# Patient Record
Sex: FEMALE | Race: WHITE
[De-identification: names, ages, dates, MRNs, and addresses within clinical notes are randomized per-mention and may not be internally consistent; named-entity substitution may affect disease eponyms.]

---

## 2021-07-20 ENCOUNTER — HOSPITAL ENCOUNTER (EMERGENCY)
Dept: HOSPITAL 97 - ER | Age: 56
Discharge: HOME | End: 2021-07-20
Payer: COMMERCIAL

## 2021-07-20 VITALS — TEMPERATURE: 98.2 F

## 2021-07-20 VITALS — DIASTOLIC BLOOD PRESSURE: 91 MMHG | SYSTOLIC BLOOD PRESSURE: 128 MMHG | OXYGEN SATURATION: 99 %

## 2021-07-20 DIAGNOSIS — R63.0: ICD-10-CM

## 2021-07-20 DIAGNOSIS — R68.84: Primary | ICD-10-CM

## 2021-07-20 DIAGNOSIS — Z88.0: ICD-10-CM

## 2021-07-20 DIAGNOSIS — I10: ICD-10-CM

## 2021-07-20 LAB
ALBUMIN SERPL BCP-MCNC: 4.2 G/DL (ref 3.4–5)
ALP SERPL-CCNC: 106 U/L (ref 45–117)
ALT SERPL W P-5'-P-CCNC: 30 U/L (ref 12–78)
AST SERPL W P-5'-P-CCNC: 21 U/L (ref 15–37)
BUN BLD-MCNC: 14 MG/DL (ref 7–18)
GLUCOSE SERPLBLD-MCNC: 131 MG/DL (ref 74–106)
HCT VFR BLD CALC: 40.9 % (ref 36–45)
INR BLD: 0.97
LYMPHOCYTES # SPEC AUTO: 1.3 K/UL (ref 0.7–4.9)
MAGNESIUM SERPL-MCNC: 2.1 MG/DL (ref 1.8–2.4)
NT-PROBNP SERPL-MCNC: 58 PG/ML (ref ?–125)
PMV BLD: 7.5 FL (ref 7.6–11.3)
POTASSIUM SERPL-SCNC: 3.7 MMOL/L (ref 3.5–5.1)
RBC # BLD: 4.65 M/UL (ref 3.86–4.86)
SP GR UR: 1.01 (ref 1–1.03)
TROPONIN (EMERG DEPT USE ONLY): < 0.02 NG/ML (ref 0–0.04)
TSH SERPL DL<=0.05 MIU/L-ACNC: 0.36 UIU/ML (ref 0.36–3.74)

## 2021-07-20 PROCEDURE — 71045 X-RAY EXAM CHEST 1 VIEW: CPT

## 2021-07-20 PROCEDURE — 85025 COMPLETE CBC W/AUTO DIFF WBC: CPT

## 2021-07-20 PROCEDURE — 84443 ASSAY THYROID STIM HORMONE: CPT

## 2021-07-20 PROCEDURE — 36415 COLL VENOUS BLD VENIPUNCTURE: CPT

## 2021-07-20 PROCEDURE — 80048 BASIC METABOLIC PNL TOTAL CA: CPT

## 2021-07-20 PROCEDURE — 93005 ELECTROCARDIOGRAM TRACING: CPT

## 2021-07-20 PROCEDURE — 83880 ASSAY OF NATRIURETIC PEPTIDE: CPT

## 2021-07-20 PROCEDURE — 84484 ASSAY OF TROPONIN QUANT: CPT

## 2021-07-20 PROCEDURE — 81003 URINALYSIS AUTO W/O SCOPE: CPT

## 2021-07-20 PROCEDURE — 81025 URINE PREGNANCY TEST: CPT

## 2021-07-20 PROCEDURE — 83735 ASSAY OF MAGNESIUM: CPT

## 2021-07-20 PROCEDURE — 85610 PROTHROMBIN TIME: CPT

## 2021-07-20 PROCEDURE — 99284 EMERGENCY DEPT VISIT MOD MDM: CPT

## 2021-07-20 PROCEDURE — 80076 HEPATIC FUNCTION PANEL: CPT

## 2021-07-20 NOTE — XMS REPORT
Continuity of Care Document

                            Created on:2021



Patient:NANCY PERERA

Sex:Female

:1965

External Reference #:[id number]





Demographics







                          Address                   [address] [id number]

 

                          Home Phone                [phone]

 

                          Work Phone                [phone]

 

                          Mobile Phone              [phone]

 

                          Email Address             [email or web address]

 

                          Preferred Language        English

 

                          Marital Status            Unknown

 

                          [employer] Affiliation     Unknown

 

                          Race                      Unknown

 

                          Additional Race(s)        Unavailable



                                                    White

 

                          Ethnic Group              Unknown









Author







                          Organization              [facility]

t

 

                          Address                   [address]



                                                    [place] [address]

 

                          Phone                     [phone]









Support







                Name            Relationship    Address         Phone

 

                ANDEL           Unavailable     .               [phone]

 

                ANDEL           Unavailable     .               [phone]

 

                ANDEL           Unavailable     [address] [phone]



                                                [place] [address] 

 

                ANDEL           Unavailable     [address] [phone]



                                                [place] [address] 

 

                Andel           Spouse          [address] [phone]

2



                                                [place] [id number] 

 

                Jin         Mother          [address] [phone]

2



                                                [place] [id number] 









Care Team Providers







                    Name                Role                Phone

 

                    Lab,  [facility]am [facility]     Attending Clinician Unavailable

 

                    Doctor Unassigned,  Name Attending Clinician Unavailable

 

                    SASSARD             Attending Clinician Unavailable









Payers







           Payer Name Policy Type Policy Number Effective Date Expiration Date S

ource







Problems

This patient has no known problems.



Allergies, Adverse Reactions, Alerts







       Allergy Allergy Status Severity Reaction(s) Onset  Inactive Treating Comm

ents 

Source



       Name   Type                        Date   Date   Clinician        

 

       Penicill DA     Active U             2018                      [facility]



       ins                                                        [place]



                                          00:00:                      [facility]



                                          00                          [facility]



                                                                      [facility]



                                                                      l







Medications

This patient has no known medications.



Procedures

This patient has no known procedures.



Encounters







        Start   End     Encounter Admission Attending Care    Care    Encounter 

Source



        Date/Time Date/Time Type    Type    Clinicians Facility Department ID   

   

 

        2020-10-26 2020-10-26 Laboratory         Lab, [facility]    [id number]

[id number] 



        10:47:02 11:07:02 Only            [facility]  [id number]         



                                                [place] [id number]         



                                                [place] [phone]         



                                                nal     044             



                                                Office                  



                                                Building                 



                                                One                     

 

        2020-10-26 2020-10-26 Letter          Doctor  ZACHARY    [id number]

90 



        00:00:00 00:00:00 (Out)           Unassigned, DANIA   [id number]       

  



                                        [facility] [id number]         



                                                        [phone]         



                                                        044             

 

        2020 Outpatient         [facility]     [id number]

[address]



        00:00:00 00:00:00                 BIJAN                  [address]

i



                                                                        st

 

        2020 Outpatient         [facility]     [id number]

[address]



        00:00:00 00:00:00                 BIJAN                  [address]     [facility]

i



                                                                        st

 

        2020 [facility]     [id number]

[address]



        00:00:00 00:00:00                 BIJAN                  [address]     [facility]

i



                                                                        st

 

        2020 Outpatient         [facility]     [id number]

[address]



        00:00:00 00:00:00                 BIJAN                  [address]     [facility]

i



                                                                        st

 

        2020 Outpatient         [facility]     [id number]

[address]



        00:00:00 00:00:00                 BIJAN                  [address]     [facility]

i



                                                                        st

 

        2020 Outpatient         [facility]     [id number]

[address]



        00:00:00 00:00:00                 BIJAN                  [address]     [address]

i



                                                                        st







Results

This patient has no known results.

## 2021-07-20 NOTE — RAD REPORT
EXAM DESCRIPTION:  Karla Single View7/20/2021 2:38 pm

 

CLINICAL HISTORY:  Chest pain

 

COMPARISON:  none

 

FINDINGS:   The lungs appear clear of acute infiltrate. The heart is normal size

 

IMPRESSION:   No acute abnormalities displayed

## 2021-07-20 NOTE — EDPHYS
Physician Documentation                                                                           

 [facility]                                                                 

Name: Alda Her                                                                                

Age: 56 yrs                                                                                       

Sex: Female                                                                                       

: 1965                                                                                   

MRN: [id number]                                                                                   

Arrival Date: 2021                                                                          

Time: 12:31                                                                                       

Account#: [id number]                                                                            

Bed 14                                                                                            

Private MD: Tressa Millan K                                                                     

ED Physician Wiley Walden                                                                       

HPI:                                                                                              

                                                                                             

14:05 This 56 yrs old  Female presents to ER via Ambulatory with complaints of Blood pm1 

      Pressure Problem, Nausea, Dizziness.                                                        

14:05 The patient presents with dizziness, hot flashes, left jaw pain, nausea, blood pressure pm1 

      elevation. Onset: The symptoms/episode began/occurred 2 week(s) ago. Context: Unknown.      

      Patient post menopausal for about 1 year. Modifying factors: The symptoms are               

      alleviated by nothing, the symptoms are aggravated by nothing. Associated signs and         

      symptoms: Pertinent negatives: abdominal pain, chest pain, diaphoresis, near-syncope,       

      numbness, shortness of breath, syncope, tingling, vomiting. Severity of symptoms: in        

      the emergency department the symptoms have improved Pain is currently a 0 / 10.             

      Patient's baseline: Neuro: alert and fully oriented, Motor: no deficits, Ambulation:        

      walks without assistance, Speech: normal.                                                   

                                                                                                  

OB/GYN:                                                                                           

13:39 LMP N/A - Post-menopause                                                                tw2 

                                                                                                  

Historical:                                                                                       

- Allergies:                                                                                      

13:09 PENICILLINS;                                                                            ph  

- PMHx:                                                                                           

13:09 Hypertensive disorder; Hypothyroidism;                                                  ph  

- PSHx:                                                                                           

13:09 shoulder;  section;                                                             ph  

                                                                                                  

- Immunization history:: Client reports having NOT received the Covid vaccine.                    

- Social history:: Smoking status: Patient denies any tobacco usage or history of.                

  Patient uses street drugs, marijuana.                                                           

                                                                                                  

                                                                                                  

ROS:                                                                                              

14:05 Constitutional: Negative for fever, chills, and weight loss.                            pm1 

14:05 Neck: Negative for injury, pain, and swelling, Cardiovascular: Negative for chest pain,     

      palpitations, and edema, Respiratory: Negative for shortness of breath, cough,              

      wheezing, and pleuritic chest pain, Abdomen/GI: Negative for abdominal pain, nausea,        

      vomiting, diarrhea, and constipation, Back: Negative for injury and pain, MS/Extremity:     

      Negative for injury and deformity, Skin: Negative for injury, rash, and discoloration.      

14:05 Neuro: Positive for dizziness, Negative for headache, numbness, syncope, near syncope,      

      tingling, weakness.                                                                         

14:05 All other systems are negative.                                                             

                                                                                                  

Exam:                                                                                             

14:05 Constitutional:  This is a well developed, well nourished patient who is awake, alert,  pm1 

      and in no acute distress. Head/Face:  Normocephalic, atraumatic.                            

14:05 Neck:  Trachea midline, no thyromegaly or masses palpated, and no cervical                  

      lymphadenopathy.  Supple, full range of motion without nuchal rigidity, or vertebral        

      point tenderness.  No Meningismus.                                                          

14:05 Chest/axilla:  Normal chest wall appearance and motion.  Nontender with no deformity.       

      No lesions are appreciated.                                                                 

14:05 Back:  No spinal tenderness.  No costovertebral tenderness.  Full range of motion.          

      Skin:  Warm, dry with normal turgor.  Normal color with no rashes, no lesions, and no       

      evidence of cellulitis. MS/ Extremity:  Pulses equal, no cyanosis.  Neurovascular           

      intact.  Full, normal range of motion.                                                      

14:05 Eyes: Exam is negative for acute changes, Extraocular movements: no acute changes,          

      Conjunctiva: no acute changes, no injection.                                                

14:05 ENT: Exam is negative for acute changes, Mouth: Lips: normal, Oral mucosa: normal, pink     

      and intact, moist.                                                                          

14:05 Cardiovascular: Exam negative for  acute changes, Rate: normal, Rhythm: regular,            

      Pulses: no pulse deficits are appreciated, Heart sounds: normal, normal S1and S2.           

14:05 Respiratory: Exam negative for  acute changes, respiratory distress, shortness of           

      breath, Breath sounds: are clear throughout.                                                

14:05 Abdomen/GI: Inspection: abdomen appears normal, Palpation: abdomen is soft and              

      non-tender, in all quadrants.                                                               

14:05 Neuro: Exam negative for acute changes, Orientation: is normal, Mentation: is normal,       

      Motor: is normal, moves all fours, Sensation: is normal, no obvious gross deficits.         

                                                                                                  

Vital Signs:                                                                                      

13:03  / 102; Pulse 86; Resp 18; Temp 98.2; Pulse Ox 100% on R/A; Weight 102.51 kg;     ph  

      Height 5 ft. 3 in. (160.02 cm);                                                             

13:38  / 99; Pulse 84; Resp 17; Pulse Ox 98% on R/A;                                    tw2 

15:02  / 87; Pulse 74; Resp 17; Pulse Ox 98% on R/A;                                    tw2 

16:29  / 88; Pulse 75; Resp 17; Pulse Ox 98% on R/A;                                    tw2 

18:10  / 91; Pulse 63; Resp 17; Pulse Ox 99% on R/A;                                    tw2 

13:03 Body Mass Index 40.03 (102.51 kg, 160.02 cm)                                            ph  

                                                                                                  

MDM:                                                                                              

13:35 Patient medically screened.                                                             pm1 

18:17 Data reviewed: vital signs. Data interpreted: Pulse oximetry: on room air is 99 %.      pm1 

      Interpretation: normal. Counseling: I had a detailed discussion with the patient and/or     

      guardian regarding: the historical points, exam findings, and any diagnostic results        

      supporting the discharge/admit diagnosis, radiology results, the need for outpatient        

      follow up, to return to the emergency department if symptoms worsen or persist or if        

      there are any questions or concerns that arise at home.                                     

                                                                                                  

                                                                                             

13:35 Order name: Urine Pregnancy--Ancillary (enter results)                                  mt  

                                                                                             

13:35 Order name: Urine Dipstick-Ancillary; Complete Time: 15:11                              EDMS

                                                                                             

13:47 Order name: Basic Metabolic Panel; Complete Time: 15:11                                 pm                                                                                             

13:47 Order name: CBC with Diff; Complete Time: 15:11                                         pm1 

                                                                                             

13:47 Order name: LFT's; Complete Time: 15:11                                                 pm                                                                                             

13:47 Order name: Magnesium; Complete Time: 15:11                                             pm                                                                                             

13:47 Order name: NT PRO-BNP; Complete Time: 15:11                                            pm                                                                                             

13:47 Order name: PT-INR; Complete Time: 15:11                                                pm                                                                                             

13:47 Order name: Troponin (emerg Dept Use Only); Complete Time: 15:11                        pm                                                                                             

13:47 Order name: XRAY Chest (1 view); Complete Time: 15:38                                   pm1 

                                                                                             

13:47 Order name: EKG; Complete Time: 13:47                                                   pm                                                                                             

13:47 Order name: Cardiac monitoring; Complete Time: 14:11                                    pm                                                                                             

13:47 Order name: TSH; Complete Time: 15:11                                                   pm                                                                                             

17:46 Order name: Troponin (emerg Dept Use Only); Complete Time: 18:17                        tw2 

                                                                                             

13:47 Order name: EKG - Nurse/Tech; Complete Time: 14:11                                      pm1 

                                                                                             

13:47 Order name: IV Saline Lock; Complete Time: 14:11                                        pm1 

                                                                                             

13:47 Order name: Labs collected and sent; Complete Time: 14:11                               pm1 

                                                                                             

13:47 Order name: O2 Per Protocol; Complete Time: 13:48                                       pm1 

                                                                                             

13:47 Order name: O2 Sat Monitoring; Complete Time: 13:48                                     pm1 

                                                                                                  

Administered Medications:                                                                         

No medications were administered                                                                  

                                                                                                  

                                                                                                  

Disposition:                                                                                      

                                                                                             

07:08 Co-signature as Attending Physician, Wiley Walden MD I agree with the assessment and   kdr 

      plan of care.                                                                               

                                                                                                  

Disposition Summary:                                                                              

21 18:23                                                                                    

Discharge Ordered                                                                                 

      Location: Home                                                                          pm1 

      Problem: new                                                                            pm1 

      Symptoms: have improved                                                                 pm1 

      Condition: Stable                                                                       pm1 

      Diagnosis                                                                                   

        - Jaw pain                                                                            pm1 

        - Anorexia - decreased appetite                                                       pm1 

        - Dizziness and giddiness                                                             pm1 

        - Essential (primary) hypertension                                                    pm1 

      Followup:                                                                               pm1 

        - With: Emergency Department                                                               

        - When: As needed                                                                          

        - Reason: Worsening of condition                                                           

      Followup:                                                                               pm1 

        - With: Private Physician                                                                  

        - When: 2 - 3 days                                                                         

        - Reason: Recheck today's complaints, Continuance of care, Re-evaluation by your           

      physician                                                                                   

      Discharge Instructions:                                                                     

        - Discharge Summary Sheet                                                             pm1 

        - Dizziness                                                                           pm1 

        - Hypertension, Adult                                                                 pm1 

        - How to Take Your Blood Pressure, Easy-to-Read                                       pm1 

        - DASH Eating Plan                                                                    pm1 

        - Managing Your Hypertension                                                          pm1 

      Forms:                                                                                      

        - Medication Reconciliation Form                                                      pm1 

        - Thank You Letter                                                                    pm1 

        - Antibiotic Education                                                                pm1 

        - Prescription Opioid Use                                                             pm1 

Signatures:                                                                                       

Dispatcher [employer]                           EDWiley Markham MD MD   [facility]                                                  

Cheryl Scales RN                      RN   ph                                                   

Nick Garcia, JULIETTE                    NP   pm1                                                  

                                                                                                  

Corrections: (The following items were deleted from the chart)                                    

                                                                                             

13:10 13:09 Allergies: No Known Allergies; ph                                                 ph  

18:43 14:05 The patient presents with dizziness, hot flashes, left jaw pain, nausea, pm1      pm1 

                                                                                                  

**************************************************************************************************

## 2021-07-21 NOTE — EKG
Test Date:    2021-07-20               Test Time:    14:01:46

Technician:   TONJA                                    

                                                     

MEASUREMENT RESULTS:                                       

Intervals:                                           

Rate:         72                                     

AR:           128                                    

QRSD:         76                                     

QT:           372                                    

QTc:          407                                    

Axis:                                                

P:            47                                     

AR:           128                                    

QRS:          54                                     

T:            73                                     

                                                     

INTERPRETIVE STATEMENTS:                                       

                                                     

Normal sinus rhythm

Nonspecific ST abnormality

Abnormal ECG

Compared to ECG 12/02/1998 12:50:00

ST (T wave) deviation now present



Electronically Signed On 07-21-21 10:41:44 CDT by Real Mon

## 2021-09-23 ENCOUNTER — HOSPITAL ENCOUNTER (EMERGENCY)
Dept: HOSPITAL 97 - ER | Age: 56
Discharge: HOME | End: 2021-09-23
Payer: COMMERCIAL

## 2021-09-23 VITALS — OXYGEN SATURATION: 98 %

## 2021-09-23 VITALS — DIASTOLIC BLOOD PRESSURE: 76 MMHG | SYSTOLIC BLOOD PRESSURE: 101 MMHG

## 2021-09-23 VITALS — TEMPERATURE: 97.9 F

## 2021-09-23 DIAGNOSIS — I10: ICD-10-CM

## 2021-09-23 DIAGNOSIS — E03.9: ICD-10-CM

## 2021-09-23 DIAGNOSIS — Z88.0: ICD-10-CM

## 2021-09-23 DIAGNOSIS — U07.1: Primary | ICD-10-CM

## 2021-09-23 PROCEDURE — 99283 EMERGENCY DEPT VISIT LOW MDM: CPT

## 2021-09-23 PROCEDURE — 71045 X-RAY EXAM CHEST 1 VIEW: CPT

## 2021-09-23 NOTE — ER
Nurse's Notes                                                                                     

 [facility]                                                                 

Name: Alda Her                                                                                

Age: 56 yrs                                                                                       

Sex: Female                                                                                       

: 1965                                                                                   

MRN: [id number]                                                                                   

Arrival Date: 2021                                                                          

Time: 06:51                                                                                       

Account#: [id number]                                                                            

Bed 10                                                                                            

Private MD:                                                                                       

Diagnosis: Coronavirus infection, unspecified                                                     

                                                                                                  

Presentation:                                                                                     

                                                                                             

06:57 Chief complaint: Patient states: SOB x 2 days. Coronavirus screen: Vaccine status:      df1 

      Patient reports being unvaccinated. Client denies travel out of the [place] in the last 14     

      days. Client presents with at least one sign or symptom that may indicate                   

      coronavirus-19. Standard/surgical mask placed on the client. The client reports             

      previous COVID testing was negative. Date of collection: 2021. Ebola          

      Screen: Patient negative for fever greater than or equal to 101.5 degrees Fahrenheit,       

      and additional compatible Ebola Virus Disease symptoms. Initial Sepsis Screen: Does the     

      patient meet any 2 criteria? No. Patient's initial sepsis screen is negative. Risk          

      Assessment: Do you want to hurt yourself or someone else? Patient reports no desire to      

      harm self or others. Onset of symptoms was 2021.                              

06:57 Method Of Arrival: Ambulatory                                                           df1 

06:57 Acuity: BERNADETTE 4                                                                           df1 

07:03 Note Pt states SOB x 2 days. Neg covid on 21. Other family at home are positive    df1 

      Covid.                                                                                      

07:16 Initial Sepsis Screen: Does the patient have a suspected source of infection? No.       es2 

      Patient's initial sepsis screen is negative.                                                

                                                                                                  

Triage Assessment:                                                                                

07:16 Respiratory: Onset: The symptoms/episode began/occurred 2 days ago, the patient has     es2 

      mild shortness of breath.                                                                   

                                                                                                  

OB/GYN:                                                                                           

07:18  5, Full Term 3, Premature 0,  2, Living 3, LMP 2020                 es2 

                                                                                                  

Historical:                                                                                       

- Allergies:                                                                                      

07:00 PENICILLINS;                                                                            df1 

- Home Meds:                                                                                      

07:00 metoprolol tartrate 50 mg Oral tab 1 tab once daily [Active]; candesartan 32 mg oral    df1 

      tab 1 tab once daily [Active];                                                              

- PMHx:                                                                                           

07:00 Hypertensive disorder; Hypothyroidism;                                                  df1 

- PSHx:                                                                                           

07:00  section; Shoulder;                                                             df1 

                                                                                                  

- Immunization history:: Adult Immunizations up to date, Client reports having NOT                

  received the Covid vaccine.                                                                     

- Social history:: Smoking status: Patient denies any tobacco usage or history of.                

  Patient/guardian denies using alcohol, street drugs.                                            

                                                                                                  

                                                                                                  

Screenin:15 Abuse screen: Denies threats or abuse. Denies injuries from another. Nutritional        es2 

      screening: No deficits noted. Tuberculosis screening: No symptoms or risk factors           

      identified. Fall Risk Gait- Normal/Bed Rest/Wheelchair (0 pts).                             

                                                                                                  

Assessment:                                                                                       

07:12 Reassessment: Patient and/or family updated on plan of care and expected duration. Pain es2 

      level reassessed. Patient is alert, oriented x 3, equal unlabored respirations, skin        

      warm/dry/pink. Pt states she has not be able to sleep for the past 2 nights. Feels like     

      she can't breath. Checked O2 at home, was 89% on RA. Denies getting COVID vaccine.          

      Reports diarrhea. General: Appears obese, well developed, well nourished, Behavior is       

      calm, cooperative, appropriate for age. Pain: Denies pain. Neuro: Level of                  

      Consciousness is awake, alert, obeys commands, Oriented to person, place, time,             

      situation, Appropriate for age Gait is steady, Speech is normal. Cardiovascular:.           

      Respiratory: Airway is patent Respiratory effort is even, Respiratory pattern is            

      regular, symmetrical. GI: Reports diarrhea. : No signs and/or symptoms were reported      

      regarding the genitourinary system. EENT: No signs and/or symptoms were reported            

      regarding the EENT system. Derm: Skin is intact. Musculoskeletal: No signs and/or           

      symptoms reported regarding the musculoskeletal system.                                     

07:18 Respiratory: Breath sounds are clear.                                                   es2 

11:33 Cardiovascular: Rhythm is regular.                                                      es2 

                                                                                                  

Vital Signs:                                                                                      

06:57  / 88; Pulse 85; Resp 20; Temp 97.9; Pulse Ox 100% on R/A; Weight 101.6 kg;       df1 

      Height 5 ft. 4 in. (162.56 cm); Pain 0/10;                                                  

07:15  / 101; Pulse 84; Resp 20; Pulse Ox 98% on R/A;                                   es2 

10:59  / 76; Pulse 71; Resp 18; Pulse Ox 98% on R/A;                                    es2 

06:57 Body Mass Index 38.45 (101.60 kg, 162.56 cm)                                            df1 

                                                                                                  

ED Course:                                                                                        

06:51 Patient arrived in ED.                                                                  bp1 

07:00 Triage completed.                                                                       df1 

07:00 [facility]                                                        kb  

07:00 Wiley Walden MD is Attending Physician.                                              kb  

07:12 Althea Anderson, RN is Primary Nurse.                                                  es2 

07:16 Arm band placed on.                                                                     es2 

07:16 Patient has correct armband on for positive identification. Bed in low position. Call   es2 

      light in reach.                                                                             

07:17 No provider procedures requiring assistance completed. Patient did not have IV access   es2 

      during this emergency room visit.                                                           

07:38 Chest Single View XRAY In Process Unspecified.                                          EDMS

                                                                                                  

Administered Medications:                                                                         

No medications were administered                                                                  

                                                                                                  

                                                                                                  

Outcome:                                                                                          

11:23 Discharge ordered by MD.                                                                kb  

11:34 Discharged to home ambulatory.                                                          es2 

11:34 Condition: stable                                                                           

11:34 Discharge instructions given to patient, Demonstrated understanding of instructions.        

11:34 Patient left the ED.                                                                    es2 

                                                                                                  

Signatures:                                                                                       

Dispatcher [employer]                           EDMS                                                 

Barbi Scruggs FNP-C                 FNP-Una Boland                           bp1                                                  

Ashley Schilling                                df1                                                  

Althea Anderson, RN                    RN   es2                                                  

                                                                                                  

Corrections: (The following items were deleted from the chart)                                    

07:03 07:00 Home Meds: None; df1                                                              df1 

08:16 07:12 CORONAVIRUS+MR.LAB.BRZ drawn and sent. es2                                        EDMS

                                                                                                  

**************************************************************************************************

## 2021-09-23 NOTE — RAD REPORT
EXAM DESCRIPTION:  Karla Single View9/23/2021 7:38 am

 

CLINICAL HISTORY:  sob

 

COMPARISON:  July 2021

 

FINDINGS:   The lungs appear clear of acute infiltrate. The heart is normal size

 

IMPRESSION:   No acute abnormalities displayed

## 2021-09-23 NOTE — EDPHYS
Physician Documentation                                                                           

 [facility]                                                                 

Name: Alda Her                                                                                

Age: 56 yrs                                                                                       

Sex: Female                                                                                       

: 1965                                                                                   

MRN: [id number]                                                                                   

Arrival Date: 2021                                                                          

Time: 06:51                                                                                       

Account#: [id number]                                                                            

Bed 10                                                                                            

Private MD:                                                                                       

ED Physician Wiley Walden                                                                       

HPI:                                                                                              

                                                                                             

07:09 This 56 yrs old  Female presents to ER via Ambulatory with complaints of       kb  

      Breathing Difficulty.                                                                       

07:09 The patient has not recently seen a physician.                                          kb  

07:10 The patient has shortness of breath at rest. Onset: The symptoms/episode began/occurred kb  

      2 day(s) ago. Duration: The symptoms are continuous. The patient's shortness of breath      

      is aggravated by nothing, is alleviated by nothing. Associated signs and symptoms: The      

      patient has no apparent associated signs or symptoms. Severity of symptoms: At their        

      worst the symptoms were mild moderate in the emergency department the symptoms are          

      unchanged. The patient has not experienced similar symptoms in the past. Pt states she      

      has had shortness of breath and it causes her not to be able to sleep. States her           

      family all have COVID.                                                                      

                                                                                                  

OB/GYN:                                                                                           

07:18  5, Full Term 3, Premature 0,  2, Living 3, LMP 2020                 es2 

                                                                                                  

Historical:                                                                                       

- Allergies:                                                                                      

07:00 PENICILLINS;                                                                            df1 

- Home Meds:                                                                                      

07:00 metoprolol tartrate 50 mg Oral tab 1 tab once daily [Active]; candesartan 32 mg oral    df1 

      tab 1 tab once daily [Active];                                                              

- PMHx:                                                                                           

07:00 Hypertensive disorder; Hypothyroidism;                                                  df1 

- PSHx:                                                                                           

07:00  section; Shoulder;                                                             df1 

                                                                                                  

- Immunization history:: Adult Immunizations up to date, Client reports having NOT                

  received the Covid vaccine.                                                                     

- Social history:: Smoking status: Patient denies any tobacco usage or history of.                

  Patient/guardian denies using alcohol, street drugs.                                            

                                                                                                  

                                                                                                  

ROS:                                                                                              

07:09 Constitutional: Negative for fever, chills, and weight loss.                            kb  

07:09 Respiratory: Positive for shortness of breath, Negative for cough, dyspnea on exertion,     

      hemoptysis, orthopnea, pleurisy, sputum production, wheezing.                               

07:09 All other systems are negative.                                                             

                                                                                                  

Exam:                                                                                             

07:09 Constitutional:  This is a well developed, well nourished patient who is awake, alert,  kb  

      and in no acute distress. Head/Face:  Normocephalic, atraumatic. ENT:  Moist Mucous         

      membranes Cardiovascular:  Regular rate and rhythm with a normal S1 and S2.  No             

      gallops, murmurs, or rubs.  No pulse deficits. Respiratory:  Respirations even and          

      unlabored. No increased work of breathing, no retractions or nasal flaring. Skin:           

      Warm, dry with normal turgor.  Normal color. MS/ Extremity:  Pulses equal, no cyanosis.     

       Neurovascular intact.  Full, normal range of motion. Neuro:  Awake and alert, GCS 15,      

      oriented to person, place, time, and situation. Moves all extremities. Normal gait.         

      Psych:  Awake, alert, with orientation to person, place and time.  Behavior, mood, and      

      affect are within normal limits.                                                            

                                                                                                  

Vital Signs:                                                                                      

06:57  / 88; Pulse 85; Resp 20; Temp 97.9; Pulse Ox 100% on R/A; Weight 101.6 kg;       df1 

      Height 5 ft. 4 in. (162.56 cm); Pain 0/10;                                                  

07:15  / 101; Pulse 84; Resp 20; Pulse Ox 98% on R/A;                                   es2 

10:59  / 76; Pulse 71; Resp 18; Pulse Ox 98% on R/A;                                    es2 

06:57 Body Mass Index 38.45 (101.60 kg, 162.56 cm)                                            df1 

                                                                                                  

MDM:                                                                                              

07:05 Patient medically screened.                                                             kb  

07:09 Data reviewed: vital signs, nurses notes. Data interpreted: Pulse oximetry: on room air kb  

      is 100 %. Interpretation: normal.                                                           

11:22 Counseling: I had a detailed discussion with the patient and/or guardian regarding: the kb  

      historical points, exam findings, and any diagnostic results supporting the                 

      discharge/admit diagnosis, lab results, the need for outpatient follow up, a family         

      practitioner, to return to the emergency department if symptoms worsen or persist or if     

      there are any questions or concerns that arise at home.                                     

11:32 ED course: Armand offered, pt refuses.                                               kb  

                                                                                                  

                                                                                             

11:24 Order name: SARS-COV-2 RT PCR; Complete Time: 11:32                                     EDMS

                                                                                             

07:01 Order name: Chest Single View XRAY; Complete Time: 07:46                                kb  

                                                                                                  

Administered Medications:                                                                         

No medications were administered                                                                  

                                                                                                  

                                                                                                  

Disposition:                                                                                      

17:05 Co-signature as Attending Physician, Wiley Walden MD I agree with the assessment and   kdr 

      plan of care.                                                                               

                                                                                                  

Disposition Summary:                                                                              

21 11:23                                                                                    

Discharge Ordered                                                                                 

      Location: Home                                                                          kb  

      Condition: Stable                                                                       kb  

      Diagnosis                                                                                   

        - Coronavirus infection, unspecified                                                  kb  

      Followup:                                                                               kb  

        - With: Emergency Department                                                               

        - When: As needed                                                                          

        - Reason: Worsening of condition                                                           

      Followup:                                                                               kb  

        - With: Private Physician                                                                  

        - When: 2 - 3 days                                                                         

        - Reason: Recheck today's complaints, Continuance of care, Re-evaluation by your           

      physician                                                                                   

      Discharge Instructions:                                                                     

        - Discharge Summary Sheet                                                             kb  

        - COVID-19                                                                            kb  

        - COVID-19 Frequently Asked Questions                                                 kb  

        - 10 Things You Can Do to Manage Your COVID-19 Symptoms at Home - [facility]                 kb  

      Forms:                                                                                      

        - Medication Reconciliation Form                                                      kb  

        - Thank You Letter                                                                    kb  

        - Antibiotic Education                                                                kb  

        - Prescription Opioid Use                                                             kb  

Signatures:                                                                                       

Dispatcher [employer]                           EDMS                                                 

Barbi Scruggs, JOSEEP-C                 FNP-Wiley Davis MD MD   [facility]                                                  

Ashley Schilling                                df1                                                  

                                                                                                  

Corrections: (The following items were deleted from the chart)                                    

07:03 07:00 Home Meds: None; df1                                                              df1 

08:16 07:01 CORONAVIRUS+MR.LAB.BRZ ordered. EDMS                                              EDMS

                                                                                                  

**************************************************************************************************

## 2021-09-24 ENCOUNTER — HOSPITAL ENCOUNTER (EMERGENCY)
Dept: HOSPITAL 97 - ER | Age: 56
Discharge: HOME | End: 2021-09-24
Payer: COMMERCIAL

## 2021-09-24 VITALS — DIASTOLIC BLOOD PRESSURE: 64 MMHG | OXYGEN SATURATION: 100 % | TEMPERATURE: 97.9 F | SYSTOLIC BLOOD PRESSURE: 98 MMHG

## 2021-09-24 DIAGNOSIS — Z88.0: ICD-10-CM

## 2021-09-24 DIAGNOSIS — E03.9: ICD-10-CM

## 2021-09-24 DIAGNOSIS — U07.1: Primary | ICD-10-CM

## 2021-09-24 DIAGNOSIS — I10: ICD-10-CM

## 2021-09-24 LAB
BUN BLD-MCNC: 13 MG/DL (ref 7–18)
GLUCOSE SERPLBLD-MCNC: 129 MG/DL (ref 74–106)
HCT VFR BLD CALC: 39.7 % (ref 36–45)
LYMPHOCYTES # SPEC AUTO: 2.1 K/UL (ref 0.7–4.9)
PMV BLD: 7.7 FL (ref 7.6–11.3)
POTASSIUM SERPL-SCNC: 3.5 MMOL/L (ref 3.5–5.1)
RBC # BLD: 4.59 M/UL (ref 3.86–4.86)

## 2021-09-24 PROCEDURE — 96365 THER/PROPH/DIAG IV INF INIT: CPT

## 2021-09-24 PROCEDURE — 85025 COMPLETE CBC W/AUTO DIFF WBC: CPT

## 2021-09-24 PROCEDURE — 99283 EMERGENCY DEPT VISIT LOW MDM: CPT

## 2021-09-24 PROCEDURE — 80048 BASIC METABOLIC PNL TOTAL CA: CPT

## 2021-09-24 PROCEDURE — 96366 THER/PROPH/DIAG IV INF ADDON: CPT

## 2021-09-24 PROCEDURE — 36415 COLL VENOUS BLD VENIPUNCTURE: CPT

## 2021-09-24 NOTE — ER
Nurse's Notes                                                                                     

 [facility]                                                                 

Name: Alda Her                                                                                

Age: 56 yrs                                                                                       

Sex: Female                                                                                       

: 1965                                                                                   

MRN: [id number]                                                                                   

Arrival Date: 2021                                                                          

Time: 15:51                                                                                       

Account#: [id number]                                                                            

Bed 13                                                                                            

Private MD:                                                                                       

Diagnosis: Coronavirus infection, unspecified                                                     

                                                                                                  

Presentation:                                                                                     

                                                                                             

16:03 Chief complaint: Patient states: patient states she was seen here yesterday, where she  ap3 

      was dx with COVID. Patient states she was offered "the infusion" but refused. However       

      after research, and talking with her dr she wants it now. Coronavirus screen: Client        

      reports previous positive COVID test result. Date of collection: 2021.        

      Ebola Screen: No symptoms or risks identified at this time. Initial Sepsis Screen: Does     

      the patient meet any 2 criteria? No. Patient's initial sepsis screen is negative. Does      

      the patient have a suspected source of infection? Yes: Productive cough/pneumonia. Risk     

      Assessment: Do you want to hurt yourself or someone else? Patient reports no desire to      

      harm self or others. Onset of symptoms was 2021.                              

16:03 Method Of Arrival: Ambulatory                                                           ap3 

16:03 Acuity: BERNADETTE 3                                                                           ap3 

                                                                                                  

Triage Assessment:                                                                                

16:07 General: Appears.                                                                       ap3 

16:07 General: Appears in no apparent distress. comfortable, Behavior is calm, cooperative,   ap3 

      appropriate for age. Pain: Denies pain. Neuro: Level of Consciousness is awake, alert,      

      obeys commands, Oriented to person, place, time, situation, Appropriate for age.            

      Cardiovascular: Capillary refill < 3 seconds Patient's skin is warm and dry.                

      Respiratory: Airway is patent Respiratory effort is even, unlabored, Respiratory            

      pattern is regular, symmetrical, Parent/caregiver reports the patient having cough that     

      is productive. GI: Parent/caregiver reports the patient having diarrhea, nausea.            

                                                                                                  

OB/GYN:                                                                                           

16:07 LMP N/A - Post-menopause                                                                ap3 

                                                                                                  

Historical:                                                                                       

- Allergies:                                                                                      

16:06 PENICILLINS;                                                                            ap3 

- Home Meds:                                                                                      

16:06 candesartan 32 mg Oral tab 1 tab once daily [Active]; metoprolol tartrate 50 mg Oral    ap3 

      tab 1 tab once daily [Active];                                                              

- PMHx:                                                                                           

16:06 Hypertensive disorder; Hypothyroidism; leaky heart valve;                               ap3 

- PSHx:                                                                                           

16:06  section; Shoulder;                                                             ap3 

                                                                                                  

- Immunization history:: Client reports having NOT received the Covid vaccine.                    

- Social history:: Smoking status: Patient denies any tobacco usage or history of.                

  Patient uses alcohol, occasionally.                                                             

                                                                                                  

                                                                                                  

Screenin:07 Abuse screen: Denies threats or abuse. Nutritional screening: No deficits noted.        ap3 

      Tuberculosis screening: No symptoms or risk factors identified.                             

16:09 Fall Risk None identified. No fall in past 12 months (0 pts). No secondary diagnosis (0 ap3 

      pts). No IV (0 pts). Ambulatory Aid- None/Bed Rest/Nurse Assist (0 pts). Gait-              

      Normal/Bed Rest/Wheelchair (0 pts) Mental Status- Oriented to own ability (0 pts).          

      Total Jacob Fall Scale indicates No Risk (0-24 pts).                                        

                                                                                                  

Assessment:                                                                                       

19:17 Reassessment: Regem-cov finish, pt to be observe for an addt'l 1 hr. no side effects    oh  

      reported. General: Appears comfortable, Behavior is calm, Reports requesting covid          

      immunoglobin, positive covid test yesterday.                                                

                                                                                                  

Vital Signs:                                                                                      

16:00  / 72; Pulse 73; Resp 17; Temp 97.9(O); Pulse Ox 100% ;                           oh  

16:03  / 85; Pulse 88; Resp 19; Temp 97.7; Pulse Ox 100% on R/A; Weight 101.6 kg;       ap3 

      Height 5 ft. 3 in. (160.02 cm);                                                             

19:16  / 69; Pulse 65; Resp 17; Temp 98.1; Pulse Ox 99% ;                               oh  

20:21 BP 98 / 64; Pulse 66; Resp 18; Temp 97.9; Pulse Ox 100% ; Pain 0/10;                    sj1 

16:03 Body Mass Index 39.68 (101.60 kg, 160.02 cm)                                            ap3 

                                                                                                  

ED Course:                                                                                        

15:51 Patient arrived in ED.                                                                  mr  

16:06 Triage completed.                                                                       ap3 

16:09 Arm band placed on left wrist.                                                          ap3 

16:10 Patient has correct armband on for positive identification. Pulse ox on. NIBP on.       ap3 

16:11 Wiley Walden MD is Attending Physician.                                              kdr 

16:53 Mansoor Youngblood, MILTON is Primary Nurse.                                                   oh  

19:05 Attending Physician role handed off by Wiley Walden MD                               [facility] 

19:05 Les Castillo MD is Attending Physician.                                             [facility] 

20:21 No provider procedures requiring assistance completed.                                  1 

20:38 IV discontinued, intact, bleeding controlled, No redness/swelling at site.              [facility] 

                                                                                                  

Administered Medications:                                                                         

18:00 Drug: Casirivimab ([id number]) (1 of 2) (EUA) 600 mg Route: IV; Rate: calculated rate;   oh  

      Site: left antecubital;                                                                     

20:40 Follow up: Response: No adverse reaction; IV Status: Completed infusion                 [facility] 

                                                                                                  

                                                                                                  

Outcome:                                                                                          

20:25 Discharge ordered by MD.                                                                [facility] 

20:38 Discharged to home                                                                      [facility] 

20:38 Condition: stable                                                                           

20:38 Discharge instructions given to Instructed on discharge instructions, follow up and         

      referral plans.                                                                             

20:40 Patient left the ED.                                                                    [facility] 

                                                                                                  

Signatures:                                                                                       

Wiley Walden MD MD   [facility]                                                  

Jolynn Renteria Amanda, RN                    RN   3                                                  

Les Castillo MD MD   [facility]                                                  

Ivy Luong RN                       RN   1                                                  

Mansoor Youngblood RN                     RN   oh                                                   

                                                                                                  

**************************************************************************************************

## 2021-09-24 NOTE — EDPHYS
Physician Documentation                                                                           

 [facility]                                                                 

Name: Alda Her                                                                                

Age: 56 yrs                                                                                       

Sex: Female                                                                                       

: 1965                                                                                   

MRN: [id number]                                                                                   

Arrival Date: 2021                                                                          

Time: 15:51                                                                                       

Account#: [id number]                                                                            

Bed 13                                                                                            

Private MD:                                                                                       

[facility] Physician Les Castillo                                                                      

HPI:                                                                                              

                                                                                             

18:09 This 56 yrs old  Female presents to ER via Ambulatory with complaints of       kdr 

      Infusion.                                                                                   

18:09 Patient was seen here yesterday for shortness of breath and Covid symptoms. She was     kdr 

      diagnosed with Covid however she time was and declined administration of the                

      remdesivir. Subsequently today, she discussed the situation with her primary care           

      physician and he recommended that she go ahead and get the infusion. She presented back     

      here today requesting the remdesivir infusion. .                                            

                                                                                                  

OB/GYN:                                                                                           

16:07 LMP N/A - Post-menopause                                                                ap3 

                                                                                                  

Historical:                                                                                       

- Allergies:                                                                                      

16:06 PENICILLINS;                                                                            ap3 

- Home Meds:                                                                                      

16:06 candesartan 32 mg Oral tab 1 tab once daily [Active]; metoprolol tartrate 50 mg Oral    ap3 

      tab 1 tab once daily [Active];                                                              

- PMHx:                                                                                           

16:06 Hypertensive disorder; Hypothyroidism; leaky heart valve;                               ap3 

- PSHx:                                                                                           

16:06  section; Shoulder;                                                             ap3 

                                                                                                  

- Immunization history:: Client reports having NOT received the Covid vaccine.                    

- Social history:: Smoking status: Patient denies any tobacco usage or history of.                

  Patient uses alcohol, occasionally.                                                             

                                                                                                  

                                                                                                  

ROS:                                                                                              

18:09 Constitutional: Negative for fever, chills, and weight loss, Eyes: Negative for injury, kdr 

      pain, redness, and discharge, ENT: Negative for injury, pain, and discharge, Neck:          

      Negative for injury, pain, and swelling, Cardiovascular: Negative for chest pain,           

      palpitations, and edema, Abdomen/GI: Negative for abdominal pain, nausea, vomiting,         

      diarrhea, and constipation, Back: Negative for injury and pain, : Negative for            

      injury, bleeding, discharge, and swelling, MS/Extremity: Negative for injury and            

      deformity, Skin: Negative for injury, rash, and discoloration, Neuro: Negative for          

      headache, weakness, numbness, tingling, and seizure activity.                               

18:09 Abdomen/GI: Positive for She had a couple episodes of diarrhea 2 days ago but took          

      Imodium and no longer has those issues.                                                     

                                                                                                  

Exam:                                                                                             

18:09 Constitutional:  This is a well developed, well nourished patient who is awake, alert,  kdr 

      and in no acute distress. Head/Face:  Normocephalic, atraumatic. Eyes:  Pupils equal        

      round and reactive to light, extra-ocular motions intact.  Lids and lashes normal.          

      Conjunctiva and sclera are non-icteric and not injected.  Cornea within normal limits.      

      Periorbital areas with no swelling, redness, or edema. Neck:  Trachea midline, no           

      thyromegaly or masses palpated, and no cervical lymphadenopathy.  Supple, full range of     

      motion without nuchal rigidity, or vertebral point tenderness.  No Meningismus.             

      Chest/axilla:  Normal chest wall appearance and motion.  Nontender with no deformity.       

      No lesions are appreciated. Cardiovascular:  Regular rate and rhythm with a normal S1       

      and S2.  No gallops, murmurs, or rubs.  Normal PMI, no JVD.  No pulse deficits.             

      Respiratory:  Lungs have equal breath sounds bilaterally, clear to auscultation and         

      percussion.  No rales, rhonchi or wheezes noted.  No increased work of breathing, no        

      retractions or nasal flaring. Abdomen/GI:  Soft, non-tender, with normal bowel sounds.      

      No distension or tympany.  No guarding or rebound.  No evidence of tenderness               

      throughout. Back:  No spinal tenderness.  No costovertebral tenderness.  Full range of      

      motion. Skin:  Warm, dry with normal turgor.  Normal color with no rashes, no lesions,      

      and no evidence of cellulitis. MS/ Extremity:  Pulses equal, no cyanosis.                   

      Neurovascular intact.  Full, normal range of motion. Neuro:  Awake and alert, GCS 15,       

      oriented to person, place, time, and situation.  Cranial nerves II-XII grossly intact.      

      Motor strength 5/5 in all extremities.  Sensory grossly intact.  Cerebellar exam            

      normal.  Normal gait. Psych:  Awake, alert, with orientation to person, place and time.     

       Behavior, mood, and affect are within normal limits.                                       

                                                                                                  

Vital Signs:                                                                                      

16:00  / 72; Pulse 73; Resp 17; Temp 97.9(O); Pulse Ox 100% ;                           oh  

16:03  / 85; Pulse 88; Resp 19; Temp 97.7; Pulse Ox 100% on R/A; Weight 101.6 kg;       ap3 

      Height 5 ft. 3 in. (160.02 cm);                                                             

19:16  / 69; Pulse 65; Resp 17; Temp 98.1; Pulse Ox 99% ;                               oh  

20:21 BP 98 / 64; Pulse 66; Resp 18; Temp 97.9; Pulse Ox 100% ; Pain 0/10;                    sj1 

16:03 Body Mass Index 39.68 (101.60 kg, 160.02 cm)                                            ap3 

                                                                                                  

MDM:                                                                                              

18:09 Data reviewed: vital signs, nurses notes, lab test result(s), radiologic studies.       kdr 

      Counseling: I had a detailed discussion with the patient and/or guardian regarding: the     

      historical points, exam findings, and any diagnostic results supporting the                 

      discharge/admit diagnosis, lab results, radiology results.                                  

20:23 Differential Diagnosis sepsis, flu, COVID. Data interpreted: Pulse oximetry: on room    [facility] 

      air is 100 %. Interpretation: normal. Response to treatment: the patient's symptoms         

      have markedly improved after treatment.                                                     

20:25 Patient medically screened.                                                             [facility] 

                                                                                                  

                                                                                             

16:44 Order name: CBC with Diff; Complete Time: 17:45                                         [facility] 

                                                                                             

16:44 Order name: Chem 7; Complete Time: 19:05                                                [facility] 

                                                                                             

16:44 Order name: COVID-19 : Document "Date of Symptom Onset" if Symptomatic.                 kdr 

                                                                                                  

Administered Medications:                                                                         

18:00 Drug: Casirivimab ([id number]) (1 of 2) (EUA) 600 mg Route: IV; Rate: calculated rate;   oh  

      Site: left antecubital;                                                                     

20:40 Follow up: Response: No adverse reaction; IV Status: Completed infusion                 sj1 

                                                                                                  

                                                                                                  

Disposition Summary:                                                                              

21 20:25                                                                                    

Discharge Ordered                                                                                 

      Location: Home                                                                          [facility] 

      Problem: an ongoing problem                                                             [facility] 

      Symptoms: have improved                                                                 [facility] 

      Condition: Stable                                                                       [facility] 

      Diagnosis                                                                                   

        - Coronavirus infection, unspecified                                                  7 

      Followup:                                                                               [facility] 

        - With: Private Physician                                                                  

        - When: 1 - 2 days                                                                         

        - Reason: Worsening of condition, Recheck today's complaints, Continuance of care,         

      Re-evaluation by your physician                                                             

      Discharge Instructions:                                                                     

        - Discharge Summary Sheet                                                             7 

        - COVID-19                                                                            [facility] 

        - COVID-19 Frequently Asked Questions                                                 [facility] 

        - 10 Things You Can Do to Manage Your COVID-19 Symptoms at Home - [place] [id number] 

      Forms:                                                                                      

        - Medication Reconciliation Form                                                      7 

        - Thank You Letter                                                                    7 

        - Antibiotic Education                                                                mh7 

        - Prescription Opioid Use                                                             [facility] 

Signatures:                                                                                       

Dispatcher [employer]                           Wiley Alvarez MD MD   [facility]                                                  

Mariya Nolan RN RN ap3                                                  

Les Castillo MD MD   7                                                  

Mansoor Youngblood, MILTON                     RN   oh                                                   

Ivy Luong RN   sj1                                                  

                                                                                                  

**************************************************************************************************

## 2024-03-26 NOTE — ER
Nurse's Notes                                                                                     

 [facility] [place]                                                                 

Name: Alda Her                                                                                

Age: 56 yrs                                                                                       

Sex: Female                                                                                       

: 1965                                                                                   

MRN: [id number]                                                                                   

Arrival Date: 2021                                                                          

Time: 12:31                                                                                       

Account#: [id number]                                                                            

Bed 14                                                                                            

Private MD: Tressa Millan K                                                                     

Diagnosis: Jaw pain;Anorexia-decreased appetite;Dizziness and giddiness;Essential (primary)       

  hypertension                                                                                    

                                                                                                  

Presentation:                                                                                     

                                                                                             

13:03 Chief complaint: Patient states: Episodes of dizziness, L jaw discomfort, BP            ph  

      fluctuations, nausea, loss of appetite, been happening for " a few week", denies fever,     

      V/D. Coronavirus screen: Client denies travel out of the [place] in the last 14 days. At       

      this time, the client does not indicate any symptoms associated with coronavirus-19.        

      Ebola Screen: No symptoms or risks identified at this time. Initial Sepsis Screen: Does     

      the patient meet any 2 criteria? No. Patient's initial sepsis screen is negative. Does      

      the patient have a suspected source of infection? No. Patient's initial sepsis screen       

      is negative. Risk Assessment: Do you want to hurt yourself or someone else? Patient         

      reports no desire to harm self or others. Onset of symptoms was 2021.              

13:03 Method Of Arrival: Ambulatory                                                           ph  

13:03 Acuity: BERNADETTE 3                                                                           ph  

                                                                                                  

OB/GYN:                                                                                           

13:39 LMP N/A - Post-menopause                                                                tw2 

                                                                                                  

Historical:                                                                                       

- Allergies:                                                                                      

13:09 PENICILLINS;                                                                            ph  

- PMHx:                                                                                           

13:09 Hypertensive disorder; Hypothyroidism;                                                  ph  

- PSHx:                                                                                           

13:09 shoulder;  section;                                                             ph  

                                                                                                  

- Immunization history:: Client reports having NOT received the Covid vaccine.                    

- Social history:: Smoking status: Patient denies any tobacco usage or history of.                

  Patient uses street drugs, marijuana.                                                           

                                                                                                  

                                                                                                  

Screenin:17 Abuse screen: Denies threats or abuse. Nutritional screening: No deficits noted.        tw2 

      Tuberculosis screening: No symptoms or risk factors identified. Fall Risk None              

      identified.                                                                                 

                                                                                                  

Assessment:                                                                                       

13:20 General: Appears in no apparent distress. obese, well groomed, Behavior is calm,        tw2 

      cooperative, appropriate for age. Pain: Complains of pain in left jaw. Neuro: Level of      

      Consciousness is awake, alert, obeys commands, Oriented to person, place, time,             

      situation. Cardiovascular: Capillary refill Patient's skin is warm and dry.                 

      Respiratory: Airway is patent Respiratory effort is even, unlabored, Respiratory            

      pattern is regular, symmetrical. GI: Abdomen is round non-distended, obese, Reports         

      nausea. : No signs and/or symptoms were reported regarding the genitourinary system.      

      EENT: No signs and/or symptoms were reported regarding the EENT system. Derm: No signs      

      and/or symptoms reported regarding the dermatologic system. Musculoskeletal: Range of       

      motion: intact in all extremities.                                                          

13:37 Reassessment: provider at bedside at this time.                                         tw2 

15:02 Reassessment: Patient appears in no apparent distress at this time. No changes from     tw2 

      previously documented assessment. Patient and/or family updated on plan of care and         

      expected duration. Pain level reassessed. Patient is alert, oriented x 3, equal             

      unlabored respirations, skin warm/dry/pink.                                                 

16:29 Reassessment: Patient appears in no apparent distress at this time. No changes from     tw2 

      previously documented assessment. Patient and/or family updated on plan of care and         

      expected duration. Pain level reassessed. Patient is alert, oriented x 3, equal             

      unlabored respirations, skin warm/dry/pink.                                                 

18:10 Reassessment: Patient appears in no apparent distress at this time. No changes from     tw2 

      previously documented assessment. Patient and/or family updated on plan of care and         

      expected duration. Pain level reassessed. Patient is alert, oriented x 3, equal             

      unlabored respirations, skin warm/dry/pink.                                                 

18:29 Reassessment: provider at bedside at this time going over results.                      tw2 

18:44 Reassessment: Patient appears in no apparent distress at this time. No changes from     tw2 

      previously documented assessment. Patient and/or family updated on plan of care and         

      expected duration. Pain level reassessed. Patient is alert, oriented x 3, equal             

      unlabored respirations, skin warm/dry/pink.                                                 

                                                                                                  

Vital Signs:                                                                                      

13:03  / 102; Pulse 86; Resp 18; Temp 98.2; Pulse Ox 100% on R/A; Weight 102.51 kg;     ph  

      Height 5 ft. 3 in. (160.02 cm);                                                             

13:38  / 99; Pulse 84; Resp 17; Pulse Ox 98% on R/A;                                    tw2 

15:02  / 87; Pulse 74; Resp 17; Pulse Ox 98% on R/A;                                    tw2 

16:29  / 88; Pulse 75; Resp 17; Pulse Ox 98% on R/A;                                    tw2 

18:10  / 91; Pulse 63; Resp 17; Pulse Ox 99% on R/A;                                    tw2 

13:03 Body Mass Index 40.03 (102.51 kg, 160.02 cm)                                            ph  

                                                                                                  

ED Course:                                                                                        

12:31 Patient arrived in ED.                                                                  mr  

12:31 Tressa Millan MD is Private Physician.                                                mr  

13:09 Triage completed.                                                                       ph  

13:10 Arm band placed on Patient placed in an exam room. Antipyretics given from triage as    ph  

      ordered by an ER provider. Antipyretics given from triage as ordered by an ER provider.     

13:11 Placed in gown. Bed in low position. Adult w/ patient. Cardiac monitor on. Pulse ox on. tw2 

      NIBP on.                                                                                    

13:17 Haydee Horn RN is Primary Nurse.                                                        tw2 

13:22 Nick Garcia NP is PHCP.                                                           pm1 

13:22 Wiley Walden MD is Attending Physician.                                              pm1 

14:11 Inserted saline lock: 20 gauge in left antecubital area, using aseptic technique. Blood tw2 

      collected.                                                                                  

14:38 XRAY Chest (1 view) In Process Unspecified.                                             EDMS

17:55 Troponin (emerg Dept Use Only) Sent.                                                    tw2 

18:43 No provider procedures requiring assistance completed. IV discontinued, intact,         tw2 

      bleeding controlled, No redness/swelling at site. Pressure dressing applied.                

                                                                                                  

Administered Medications:                                                                         

No medications were administered                                                                  

                                                                                                  

                                                                                                  

Outcome:                                                                                          

18:23 Discharge ordered by MD.                                                                pm1 

18:43 Discharged to home ambulatory.                                                          tw2 

18:43 Condition: stable                                                                           

18:43 Discharge instructions given to patient, Instructed on discharge instructions, follow       

      up and referral plans. Demonstrated understanding of instructions, follow-up care.          

18:44 Patient left the ED.                                                                    tw2 

                                                                                                  

Signatures:                                                                                       

Dispatcher [employer]                           EDMS                                                 

RenteriaJolynn rees                                                                                    

Cheryl Scales RN                      RN   ph                                                   

Nick Garcia NP                    NP   pm1                                                  

Haydee Horn RN                          RN   tw2                                                  

                                                                                                  

Corrections: (The following items were deleted from the chart)                                    

13:09 13:03 Chief complaint: ph                                                               ph  

13:10 13:09 Allergies: No Known Allergies; ph                                                 ph  

                                                                                                  

**************************************************************************************************
Walk in